# Patient Record
(demographics unavailable — no encounter records)

---

## 2024-10-16 NOTE — HISTORY OF PRESENT ILLNESS
[FreeTextEntry1] : 76-year-old man with history of hypertension, nephrolithiasis presents for bilateral leg pain and intermittent leg swelling. It is unclear whether he has claudication from his history but his swelling has been longstanding. He denies rest pain. He denies any history of non-healing wounds.